# Patient Record
(demographics unavailable — no encounter records)

---

## 2024-12-09 NOTE — DISCUSSION/SUMMARY
[de-identified] : We discussed injection followed by formal PT, a home exercise program, ice therapy and the role of NSAIDS for the pain. These modalities will improve the patient's functionality in their activities of daily life.  Risks, benefits and contraindications were discussed.  The patient will follow up in 6 weeks or sooner as needed.

## 2024-12-09 NOTE — HISTORY OF PRESENT ILLNESS
[de-identified] : Patient reports an episode while coming down a flight of stairs when she felt something "explode" in the knee about 5 weeks ago.

## 2024-12-09 NOTE — PHYSICAL EXAM
[Normal Coordination] : normal coordination [Normal Sensation] : normal sensation [Normal Mood and Affect] : normal mood and affect [Oriented] : oriented [Able to Communicate] : able to communicate [Well Developed] : well developed [Well Nourished] : well nourished [4___] : hamstring 4[unfilled]/5 [Right] : right knee [Lateral] : lateral [[place]] : skyline [AP Standing] : anteroposterior standing [Mild tricompartmental OA medial narrowing] : Mild tricompartmental OA medial narrowing [Mild patellofemoral OA] : Mild patellofemoral OA [] : no extensor lag [TWNoteComboBox7] : flexion 100 degrees

## 2025-01-09 NOTE — HISTORY OF PRESENT ILLNESS
[Patient reported mammogram was normal] : Patient reported mammogram was normal [Patient reported PAP Smear was normal] : Patient reported PAP Smear was normal [Patient reported bone density results were normal] : Patient reported bone density results were normal [Patient reported colonoscopy was normal] : Patient reported colonoscopy was normal [FreeTextEntry1] : 58yo  s/p MICHAEL for fibroids in 2016 with T2DM is here for annual and to discuss finding of 1.6vm R ov cyst on MRI of back. She is certain she had her ovaries removed at the time of her bowel resection.   Pt c/o 1 month of vulvar itching only at night. She is currently on abx and says she is very prone to yeast. She has been using Monistat 7 with no real improvement.  [Mammogramdate] : 2024 [PapSmeardate] : 2022 [BoneDensityDate] : 2024 [ColonoscopyDate] : 2020 [TextBox_43] : overdue since 2023

## 2025-01-09 NOTE — PHYSICAL EXAM
[Appropriately responsive] : appropriately responsive [Alert] : alert [No Acute Distress] : no acute distress [No Lymphadenopathy] : no lymphadenopathy [Regular Rate Rhythm] : regular rate rhythm [No Murmurs] : no murmurs [Clear to Auscultation B/L] : clear to auscultation bilaterally [Soft] : soft [Non-tender] : non-tender [Non-distended] : non-distended [No HSM] : No HSM [No Lesions] : no lesions [No Mass] : no mass [Oriented x3] : oriented x3 [Examination Of The Breasts] : a normal appearance [No Masses] : no breast masses were palpable [Labia Majora] : normal [Labia Minora] : normal [No Bleeding] : There was no active vaginal bleeding [Normal] : normal [Uterine Adnexae] : normal [Declined] : Patient declined rectal exam [FreeTextEntry6] : Scarring from implants and removal  [FreeTextEntry7] : obese  [FreeTextEntry2] : b/l excoriations on labia majora b/l.

## 2025-01-09 NOTE — DISCUSSION/SUMMARY
[FreeTextEntry1] : 58yo  s/p MICHAEL for fibroids in 2016 with T2DM (not currently on meds) with b/l labia majora excoriations likely 2/2 yeast.   T2DM: - Pt strongly urged to keep her appt with her new Endo she has upcoming   ? R ov cyst:  - TVUS and MD visit  - Pt to complete medical record form to get results from [facility] surgery with Dr Lainez and Violeta removed her ovaries.   Vulvar itching:  - Diflucan rx;d today  - Continue Monistat 7  - BD affirm today. Call for results  - Vulvar bx if all neg. Pt aware that she cannot have anything in vagina for 2 wks post procedure if she needs to have it done.   RHM:  - Pap/HPV sent   - Dentist, PCP, Derm as discussed  - Rx given for DEXA, mammo/sono - Colonoscopy as discussed -- pt due ASAP. Discussed importance of following up for colon ca screening. She verbalized understanding.  - Offered STI screen today. Pt Declined  - Encouraged healthy diet, exercise, weight loss   I spent 30 min in taking history, examining, consulting and writing the note for this patient.  Linda Berry MD  Obstetrician/Gynecologist

## 2025-02-07 NOTE — HISTORY OF PRESENT ILLNESS
[FreeTextEntry1] : 60yo  s/p MICHAEL for fibroids in 2016  and follow up BSO at later surgery for hemicolectomy with T2DM is is being followed up via telephonic visit to discuss finding of 1.6cm R ov cyst on MRI of back.  She had a TVUS which demonstrated surgically absent uterus and ovaries b/l but a 1.4 x 1.3 x 1.4cm R adnexal cystic structure.   She also wanted to discuss her recently findings on a CTPE done last month during an admission for PNA.  She was noted to have an "unchanged 1cm nodular density" in her R breast. She does have dense breasts and her 2024 mammo was BIRADS 2.

## 2025-02-07 NOTE — REASON FOR VISIT
[Other Location: e.g. School (Enter Location, City,State)___] : at [unfilled], at the time of the visit. [Other Location: e.g. Home (Enter Location, City,State)___] : at [unfilled] [Telephone (audio)] : This telephonic visit was provided via audio only technology. [Verbal consent obtained from patient] : the patient, [unfilled]

## 2025-02-07 NOTE — DISCUSSION/SUMMARY
[FreeTextEntry1] : 159yo  s/p MICHAEL for  fibroids in 2016 and follow up BSO at later surgery for hemicolectomy with T2DM with persistent and stable R adnexal cystic lesion and stable R breast nodularity on CTPE.   1 cm nodular breast density is unchanged: 2024 mammo BIRADS 2.  - Referred to Dr Traore for second opinion   R adnexal cystic lesion:  - CT scan with contrast IV and oral to r/o bowel involvement vs vascular.  - Pt to obtain path report from [facility] surgery with Dr Mcdaniel and Yehuda (BSO at time of hemicolecgtomy)  - Pt to see Dr Larry in event this is not GYN in origin could be bowel related.  If no bowel or GYN involvement would refer to Uro next.  - All questions were solicited and answered  Linda Berry MD  Obstetrician/Gynecologist  24 min spent on call reviewing & evaluating results, counseling patient, developing plan and writing note.

## 2025-02-07 NOTE — END OF VISIT
----- Message from Chantelle Bedolla MD sent at 5/31/2022  7:09 PM CDT -----  Patient has severe coronary calcification. 1.Please refer patient to Cardiology for consult. 2. Recommendation in the meantime is to take enteric-coated aspirin 81 mg daily and start rosuvastatin 10 mg daily.    3. Check ALT AST in 2 months   4. keep appointment in September [Time Spent: ___ minutes] : I have spent [unfilled] minutes of time on the encounter which excludes teaching and separately reported services.

## 2025-03-31 NOTE — PHYSICAL EXAM
[Normal Coordination] : normal coordination [Normal Sensation] : normal sensation [Normal Mood and Affect] : normal mood and affect [Oriented] : oriented [Able to Communicate] : able to communicate [Well Developed] : well developed [Well Nourished] : well nourished [4___] : hamstring 4[unfilled]/5 [Right] : right knee [Lateral] : lateral [[place]] : skyline [AP Standing] : anteroposterior standing [Mild tricompartmental OA medial narrowing] : Mild tricompartmental OA medial narrowing [Mild patellofemoral OA] : Mild patellofemoral OA [] : no extensor lag [TWNoteComboBox7] : flexion 100 degrees Statement Selected

## 2025-03-31 NOTE — HISTORY OF PRESENT ILLNESS
[de-identified] : Patient reports improvement to pain with PT and HEP until recently when she was packing for a trip, then sitting on a plane, followed by extended walking about 3 weeks ago.. The pain is in the medial PF knee.

## 2025-03-31 NOTE — DISCUSSION/SUMMARY
[Medication Risks Reviewed] : Medication risks reviewed [de-identified] : We discussed MRI to eval for medial meniscus tear, we did discuss giving formal PT another round as patellofemoral symptoms are kicked up due to sitting on a plane, a home exercise program, ice therapy and the role of NSAIDS for the pain (patient cannot take due to asthma). These modalities will improve the patient's functionality in their activities of daily life.  Risks, benefits and contraindications were discussed.  The patient will follow up in 6 weeks or sooner as needed.

## 2025-04-14 NOTE — DISCUSSION/SUMMARY
[Medication Risks Reviewed] : Medication risks reviewed [de-identified] : We discussed  brace, continued formal PT, a home exercise program, ice therapy and the role of NSAIDS for the pain. These modalities will improve the patient's functionality in their activities of daily life.  Risks, benefits and contraindications were discussed.  The patient will follow up in 6 weeks or sooner as needed. We did discuss that some patients may go on to need TKA. She would like to attempt bracing first.

## 2025-04-14 NOTE — PHYSICAL EXAM
[Normal Coordination] : normal coordination [Normal Sensation] : normal sensation [Normal Mood and Affect] : normal mood and affect [Oriented] : oriented [Able to Communicate] : able to communicate [Well Developed] : well developed [Well Nourished] : well nourished [4___] : hamstring 4[unfilled]/5 [Right] : right knee [Lateral] : lateral [[place]] : skyline [AP Standing] : anteroposterior standing [Mild tricompartmental OA medial narrowing] : Mild tricompartmental OA medial narrowing [Mild patellofemoral OA] : Mild patellofemoral OA [] : no extensor lag [TWNoteComboBox7] : flexion 100 degrees

## 2025-04-14 NOTE — DATA REVIEWED
[MRI] : MRI [Right] : of the right [Knee] : knee [Report was reviewed and noted in the chart] : The report was reviewed and noted in the chart [I reviewed the films/CD] : I reviewed the films/CD [FreeTextEntry1] : Tricompartmental OA with associated medial meniscus tear and medial femoral subchondral fracture, MCL sprain

## 2025-04-21 NOTE — PHYSICAL EXAM
[Normocephalic] : normocephalic [Atraumatic] : atraumatic [Supple] : supple [No Supraclavicular Adenopathy] : no supraclavicular adenopathy [No Thyromegaly] : no thyromegaly [Examined in the supine and seated position] : examined in the supine and seated position [Bra Size: ___] : Bra Size: [unfilled] [No dominant masses] : no dominant masses in right breast  [No dominant masses] : no dominant masses left breast [No Nipple Retraction] : no left nipple retraction [No Nipple Discharge] : no left nipple discharge [No Axillary Lymphadenopathy] : no left axillary lymphadenopathy [No Edema] : no edema [No Rashes] : no rashes [No Ulceration] : no ulceration [Asymmetrical] : asymmetrical [No Swelling] : no swelling [Full ROM] : full range of motion [de-identified] :  Bilat scars PA and inframammary from her prior surgeries. left nipple deviates downward. Rash on bilat breast and just superior to left areola. No suspicious masses [TextEntry] : Psych: Appropriate, NAD, A&Ox3 Neuro: Intact grossly, gait normal

## 2025-04-21 NOTE — DATA REVIEWED
[FreeTextEntry1] : 11/22/2024 NFR bilat sMMG: TC 12.7% There are scattered areas of FG density. No suspicious mass, suspicious microcalcifications, or other sign of malignancy is identified. Bilateral post surgical changes are noted. Interval removal of bilateral implants. NAMAN- Grade 0.  IMPRESSION: No mammographic evidence of malignancy. RECOMMENDATION: Mammography in 1 year. BR2  1/22/2025 [facility] CT ANGIO CHEST CT with and without IV contrast: HEART/VASCULATURE: Normal heart size. No pericardial effusion. No pulmonary embolus. LUNGS/AIRWAYS/PLEURA: Patent trachea and bronchi. Clear lungs. No pleural effusion. LYMPH NODES/MEDIASTINUM: No lymphadenopathy. UPPER ABDOMEN: Cholecystectomy. BONES/SOFT TISSUES: Unchanged thin elongated fluid collection in the posterior left breast. Surgical clip in the right breast. Unchanged medial *right breast 1 cm nodular density. IMPRESSION: No pulmonary embolus.

## 2025-04-21 NOTE — ASSESSMENT
[FreeTextEntry1] : Referred by Dr. Linda Berry.   Patient is a 60-year-old female here today for consultation.  Pt denies any breast lesions, discharge or masses.  She had a sMMG in November this year, which was normal and in January she had a Chest CT which showed a right breast 1cm nodular density (which has been stable when compared with 2022 CT). Pt reports she had multiple breast surgeries in the past; she had breast implants placed 30 yrs ago, then had complications post-op, had them replaced, additional complications after her second surgery, and then ultimately got them removed without getting them replaced.  She has a b/l breast rash that she states she has been putting cortisone cream on and it has helped, also has intermittent L nipple rash that comes and goes and is seasonal, this has been chronic and present for years now. She has not yet tried the cortisone cream to the L nipple rash.  11/22/2024 NFR bilat sMMG: TC 12.7% There are scattered areas of FG density. No suspicious mass, suspicious microcalcifications, or other sign of malignancy is identified. Bilateral post surgical changes are noted. Interval removal of bilateral implants. BAC- Grade 0.  IMPRESSION: No mammographic evidence of malignancy. RECOMMENDATION: Mammography in 1 year. BR2  1/22/2025 [facility] CT ANGIO CHEST CT (compared with 2022 study)with and without IV contrast: HEART/VASCULATURE: Normal heart size. No pericardial effusion. No pulmonary embolus. LUNGS/AIRWAYS/PLEURA: Patent trachea and bronchi. Clear lungs. No pleural effusion. LYMPH NODES/MEDIASTINUM: No lymphadenopathy. UPPER ABDOMEN: Cholecystectomy. BONES/SOFT TISSUES: Unchanged thin elongated fluid collection in the posterior left breast. Surgical clip in the right breast. UNCHANGED medial *right breast 1 cm nodular density. IMPRESSION: No pulmonary embolus.  Fhx: Lung (w/mets to bone)- mAunt (1/1) age 70/s. Kidney- mUncle 70. Prostate- Borther, 60's.   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  CBE: B cup, Bilat scars PA and inframammary from her prior surgeries. left nipple deviates downward. Rash on bilat breast and just superior to left areola. No suspicious masses. No axillary or SC lymphadenopathy.   Reviewed with pt her the results of the CT Angio, the right breast nodular density is unchanged when compared with 2022. Her MMg on 11/22/2024 did not demonstrate any suspicious findings, BR2. Will review with radiology. Bilat breast rash, pt states improved with cortisone. Rec cortisone cream also to area above left areolar. If no improvement rec appt with Derm. Pf is unhappy with the multiple scars on her breasts and the asymmetry from her prior breast surgeries.  Contact given for Dr. Vergara.   PLAN: Review mmg and CT Angio right breast nodular density (On review again, there is no change since 2022), rec age appropriate screenings with PCP/GYN Appt with dermatology as needed if no improvement with cortisone ointment to rash, if rash near nipple does not resolve, f.u with us. Plastics consult for revision.

## 2025-04-21 NOTE — REVIEW OF SYSTEMS
[As Noted in HPI] : as noted in HPI [Negative] : Heme/Lymph [FreeTextEntry5] : Hx of telly.fib, pericardial effusion.  [FreeTextEntry6] : asthma [FreeTextEntry9] : Hx of herniated discs [de-identified] : B/L breast rash, L nipple rash ? eczema,  [de-identified] : Hypothyroidism [FreeTextEntry1] : Sjogren's

## 2025-04-21 NOTE — ASSESSMENT
[FreeTextEntry1] : Referred by Dr. Linda Berry.   Patient is a 60-year-old female here today for consultation.  Pt denies any breast lesions, discharge or masses.  She had a sMMG in November this year, which was normal and in January she had a Chest CT which showed a right breast 1cm nodular density (which has been stable when compared with 2022 CT). Pt reports she had multiple breast surgeries in the past; she had breast implants placed 30 yrs ago, then had complications post-op, had them replaced, additional complications after her second surgery, and then ultimately got them removed without getting them replaced.  She has a b/l breast rash that she states she has been putting cortisone cream on and it has helped, also has intermittent L nipple rash that comes and goes and is seasonal, this has been chronic and present for years now. She has not yet tried the cortisone cream to the L nipple rash.  11/22/2024 NFR bilat sMMG: TC 12.7% There are scattered areas of FG density. No suspicious mass, suspicious microcalcifications, or other sign of malignancy is identified. Bilateral post surgical changes are noted. Interval removal of bilateral implants. BAC- Grade 0.  IMPRESSION: No mammographic evidence of malignancy. RECOMMENDATION: Mammography in 1 year. BR2  1/22/2025 [facility] CT ANGIO CHEST CT (compared with 2022 study)with and without IV contrast: HEART/VASCULATURE: Normal heart size. No pericardial effusion. No pulmonary embolus. LUNGS/AIRWAYS/PLEURA: Patent trachea and bronchi. Clear lungs. No pleural effusion. LYMPH NODES/MEDIASTINUM: No lymphadenopathy. UPPER ABDOMEN: Cholecystectomy. BONES/SOFT TISSUES: Unchanged thin elongated fluid collection in the posterior left breast. Surgical clip in the right breast. UNCHANGED medial *right breast 1 cm nodular density. IMPRESSION: No pulmonary embolus.  Fhx: Lung (w/mets to bone)- mAunt (1/1) age 70/s. Kidney- mUncle 70. Prostate- Borther, 60's.   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 left nipple deviates downward. Rash on bilat breast and just superior to left areola. No suspicious masses. No axillary or SC lymphadenopathy.   Reviewed with pt her the results of the CT Angio, the right breast nodular density is unchanged when compared with 2022. Her MMg on 11/22/2024 did not demonstrate any suspicious findings, BR2. Will review with radiology. Bilat breast rash, pt states improved with cortisone. Rec cortisone cream also to area above left areolar. If no improvement rec appt with Derm. Pf is unhappy with the multiple scars on her breasts and the asymmetry from her prior breast surgeries.  Contact given for Dr. Vergara.   PLAN: Review mmg and CT Angio right breast nodular density (On review again, there is no change since 2022), rec age appropriate screenings with PCP/GYN Appt with dermatology as needed if no improvement with cortisone ointment to rash, if rash near nipple does not resolve, f.u with us. Plastics consult for revision.

## 2025-04-21 NOTE — PHYSICAL EXAM
[Normocephalic] : normocephalic [Atraumatic] : atraumatic [Supple] : supple [No Supraclavicular Adenopathy] : no supraclavicular adenopathy [No Thyromegaly] : no thyromegaly [Examined in the supine and seated position] : examined in the supine and seated position [Bra Size: ___] : Bra Size: [unfilled] [No dominant masses] : no dominant masses in right breast  [No dominant masses] : no dominant masses left breast [No Nipple Retraction] : no left nipple retraction [No Nipple Discharge] : no left nipple discharge [No Axillary Lymphadenopathy] : no left axillary lymphadenopathy [No Edema] : no edema [No Rashes] : no rashes [No Ulceration] : no ulceration [Asymmetrical] : asymmetrical [No Swelling] : no swelling [Full ROM] : full range of motion [de-identified] :  Bilat scars PA and inframammary from her prior surgeries. left nipple deviates downward. Rash on bilat breast and just superior to left areola. No suspicious masses [TextEntry] : Psych: Appropriate, NAD, A&Ox3 Neuro: Intact grossly, gait normal

## 2025-04-21 NOTE — REVIEW OF SYSTEMS
[As Noted in HPI] : as noted in HPI [Negative] : Heme/Lymph [FreeTextEntry5] : Hx of telly.fib, pericardial effusion.  [FreeTextEntry6] : asthma [FreeTextEntry9] : Hx of herniated discs [de-identified] : B/L breast rash, L nipple rash ? eczema,  [de-identified] : Hypothyroidism [FreeTextEntry1] : Sjogren's

## 2025-04-21 NOTE — HISTORY OF PRESENT ILLNESS
[FreeTextEntry1] : Referred by Dr. Linda Berry.   Patient is a 60-year-old female here today for consultation.  Pt denies any breast lesions, discharge or masses.  She had a sMMG in November this year, which was normal and in January she had a Chest CT which showed a right breast 1cm nodular density (which has been stable when compared with 2022 CT). Pt reports she had multiple breast surgeries in the past; she had breast implants placed 30 yrs ago, then had complications post-op, had them replaced, additional complications after her second surgery, and then ultimately got them removed without getting them replaced.  She has a b/l breast rash that she states she has been putting cortisone cream on and it has helped, also has intermittent L nipple rash that comes and goes and is seasonal, this has been chronic and present for years now. She has not yet tried the cortisone cream to the L nipple rash.  11/22/2024 NFR bilat sMMG: TC 12.7% There are scattered areas of FG density. No suspicious mass, suspicious microcalcifications, or other sign of malignancy is identified. Bilateral post surgical changes are noted. Interval removal of bilateral implants. NAMAN- Grade 0.  IMPRESSION: No mammographic evidence of malignancy. RECOMMENDATION: Mammography in 1 year. BR2  1/22/2025 [facility] CT ANGIO CHEST CT with and without IV contrast: HEART/VASCULATURE: Normal heart size. No pericardial effusion. No pulmonary embolus. LUNGS/AIRWAYS/PLEURA: Patent trachea and bronchi. Clear lungs. No pleural effusion. LYMPH NODES/MEDIASTINUM: No lymphadenopathy. UPPER ABDOMEN: Cholecystectomy. BONES/SOFT TISSUES: Unchanged thin elongated fluid collection in the posterior left breast. Surgical clip in the right breast. Unchanged medial *right breast 1 cm nodular density. IMPRESSION: No pulmonary embolus.  Fhx: Lung (w/mets to bone)- mAunt (1/1) age 70/s. Kidney- mUncle 70. Prostate- Borther, 60's.

## 2025-04-21 NOTE — ADDENDUM
[FreeTextEntry1] : PC with pt, Pt called back and wondering about the fluid in the posterior left breast. Discussed it has been stable since 2022 and maybe a result of her postop changes given all her prior surgeries, seroma. She no longer has any implants. Discussed if revision is done, the fluid may be removed. She does plan on seeing DR. Vergara.

## 2025-04-21 NOTE — PAST MEDICAL HISTORY
[Postmenopausal] : The patient is postmenopausal [Menarche Age ____] : age at menarche was [unfilled] [Menopause Age____] : age at menopause was [unfilled] [Total Preg ___] : G[unfilled] [Live Births ___] : P[unfilled]  [Living ___] : Living: [unfilled] [Age At Live Birth ___] : Age at live birth: [unfilled] [FreeTextEntry5] : Hysterectomy for fibroids 2016- & BSO at a later date

## 2025-05-07 NOTE — PHYSICAL EXAM
[Normal Coordination] : normal coordination [Normal Sensation] : normal sensation [Normal Mood and Affect] : normal mood and affect [Oriented] : oriented [Able to Communicate] : able to communicate [Well Developed] : well developed [Well Nourished] : well nourished [4___] : hamstring 4[unfilled]/5 [Right] : right knee [Lateral] : lateral [[place]] : skyline [AP Standing] : anteroposterior standing [Mild tricompartmental OA medial narrowing] : Mild tricompartmental OA medial narrowing [Mild patellofemoral OA] : Mild patellofemoral OA [] : no extensor lag [TWNoteComboBox7] : flexion 100 degrees

## 2025-05-07 NOTE — DISCUSSION/SUMMARY
[de-identified] : We discussed that patient may benefit from viscosupplementation injection, formal PT, a home exercise program, ice therapy and the role of NSAIDS for the pain. These modalities will improve the patient's functionality in their activities of daily life.  Risks, benefits and contraindications were discussed.  The patient will follow up in 6 weeks or sooner as needed. We did also discuss possible surgical candidacy and encouraged patient to seek opinion with joint specialist to discuss possible TKA.

## 2025-05-07 NOTE — HISTORY OF PRESENT ILLNESS
[de-identified] : Patient is following up on RT knee pain. Patient states that she tried out brace 2x and states it was too uncomfortable. Patient would like to go over sx or injections options.

## 2025-05-17 NOTE — HISTORY OF PRESENT ILLNESS
[de-identified] : HPI: 60-year-old female history of Sjogren's and diabetes as well as chronic asthma presents complaining of right knee pain she has had for a few months.  She denies any traumatic injury notes she was walking down some steps when she felt a pull in the back of her knee had immediate pain and swelling.  She was seen by another orthopedist who initially gave a steroid injection after receiving no relief she started physical therapy and after some time obtained an MRI.  Her pain now is episodic and has improved.  She is not currently taking any anti-inflammatory medications because they do exacerbate her asthma.  She is otherwise in her normal state of health she is using lysing a medial offloading brace and again her symptoms have improved although she still has difficulty going up and down stairs pain is 4 out of 10 she still notes some swelling occasionally she feels like the knee wants to buckle.  Her symptoms are slowly improving she did get a steroid injection which gave her really no relief and just because her sugars to skyrocket.

## 2025-05-17 NOTE — DISCUSSION/SUMMARY
[Medication Risks Reviewed] : Medication risks reviewed [Surgical risks reviewed] : Surgical risks reviewed [de-identified] : Impression: Exacerbation left knee osteoarthritis associated medial meniscus tear Left knee pain slowly improving  Plan: Treatment options were reviewed with the patient she is at this time slowly improving and prior to this episode did not have significant knee pain at this time recommend conservative treatment with protected weightbearing utilization of the medial offloading brace ice moist heat elevation and activity modification.  Will see if the patient continues to slowly improve at some point she may be a candidate for knee replacement surgery.  Patient is in agreement with the plan.  A total of 45 minutes of face-to-face time was utilized to review with the patient nature of the diagnosis and treatment options.

## 2025-05-17 NOTE — PHYSICAL EXAM
[Antalgic] : not antalgic [LE] : Sensory: Intact in bilateral lower extremities [DP] : dorsalis pedis 2+ and symmetric bilaterally [PT] : posterior tibial 2+ and symmetric bilaterally [Obese] : obese [Normal] : no peripheral adenopathy appreciated [de-identified] : Examination right knee: Inspection: No signs of acute trauma mild varus deformity Palpation: Trace palpable effusion with tenderness over the medial compartment Range of motion: 0-1 20 with patellofemoral crepitus noted with range of motion Ligamentous exam stable varus and valgus stress Motor and sensory exam are intact Calf is soft with no calf tenderness [de-identified] : 3 views of the right knee: There is tricompartmental osteoarthritic changes noted worse in the medial compartment with almost bone-on-bone contact  Review of the MRI of the left knee: There is medial meniscus tear severe arthritic changes in the medial compartment with underlying bone edema in the medial femoral condyle

## 2025-06-04 NOTE — DISCUSSION/SUMMARY
[Medication Risks Reviewed] : Medication risks reviewed [de-identified] : We discussed that patient would benefit from viscosupplementation injection, continued formal PT, a home exercise program, ice therapy and the role of NSAIDS for the pain. These modalities will improve the patient's functionality in their activities of daily life.  Risks, benefits and contraindications were discussed.  The patient will follow up in 6 weeks or sooner as needed.

## 2025-06-04 NOTE — DISCUSSION/SUMMARY
[Medication Risks Reviewed] : Medication risks reviewed [de-identified] : We discussed that patient would benefit from viscosupplementation injection, continued formal PT, a home exercise program, ice therapy and the role of NSAIDS for the pain. These modalities will improve the patient's functionality in their activities of daily life.  Risks, benefits and contraindications were discussed.  The patient will follow up in 6 weeks or sooner as needed.

## 2025-06-04 NOTE — PHYSICAL EXAM
[Normal Coordination] : normal coordination [Normal Sensation] : normal sensation [Normal Mood and Affect] : normal mood and affect [Oriented] : oriented [Able to Communicate] : able to communicate [Well Developed] : well developed [Well Nourished] : well nourished [4___] : hamstring 4[unfilled]/5 [Right] : right knee [Lateral] : lateral [[place]] : skyline [AP Standing] : anteroposterior standing [Mild tricompartmental OA medial narrowing] : Mild tricompartmental OA medial narrowing [Mild patellofemoral OA] : Mild patellofemoral OA [] : no extensor lag [[id number]] : flexion 100 degrees

## 2025-06-04 NOTE — HISTORY OF PRESENT ILLNESS
[de-identified] : Patient reports improvement to pain and increased activity but notes episodes of instability and posterior pain.

## 2025-06-04 NOTE — PHYSICAL EXAM
[Normal Coordination] : normal coordination [Normal Sensation] : normal sensation [Normal Mood and Affect] : normal mood and affect [Oriented] : oriented [Able to Communicate] : able to communicate [Well Developed] : well developed [Well Nourished] : well nourished [4___] : hamstring 4[unfilled]/5 [Right] : right knee [Lateral] : lateral [[place]] : skyline [AP Standing] : anteroposterior standing [Mild tricompartmental OA medial narrowing] : Mild tricompartmental OA medial narrowing [Mild patellofemoral OA] : Mild patellofemoral OA [] : no extensor lag [[id number]] : flexion 100 degrees

## 2025-06-04 NOTE — HISTORY OF PRESENT ILLNESS
[de-identified] : Patient reports improvement to pain and increased activity but notes episodes of instability and posterior pain.

## 2025-07-24 NOTE — PROCEDURE
[Large Joint Injection] : Large joint injection [Right] : of the right [Knee] : knee [Pain] : pain [Inflammation] : inflammation [X-ray evidence of Osteoarthritis on this or prior visit] : x-ray evidence of Osteoarthritis on this or prior visit [Alcohol] : alcohol [Betadine] : betadine [Ethyl Chloride sprayed topically] : ethyl chloride sprayed topically [Visco-3 (25mg)] : 25mg of Visco-3 [#1] : series #1 [] : Patient tolerated procedure well [Call if redness, pain or fever occur] : call if redness, pain or fever occur [Apply ice for 15min out of every hour for the next 12-24 hours as tolerated] : apply ice for 15 minutes out of every hour for the next 12-24 hours as tolerated [Previous OTC use and PT nontherapeutic] : patient has tried OTC's including aspirin, Ibuprofen, Aleve, etc or prescription NSAIDS, and/or exercises at home and/or physical therapy without satisfactory response [Patient had decreased mobility in the joint] : patient had decreased mobility in the joint [Risks, benefits, alternatives discussed / Verbal consent obtained] : the risks benefits, and alternatives have been discussed, and verbal consent was obtained [Altered anatomic landmarks d/t erosive arthritis] : altered anatomic landmarks d/t erosive arthritis [All ultrasound images have been permanently captured and stored accordingly in our picture archiving and communication system] : All ultrasound images have been permanently captured and stored accordingly in our picture archiving and communication system [Visualization of the needle and placement of injection was performed without complication] : visualization of the needle and placement of injection was performed without complication